# Patient Record
Sex: MALE | Race: WHITE | NOT HISPANIC OR LATINO | URBAN - METROPOLITAN AREA
[De-identification: names, ages, dates, MRNs, and addresses within clinical notes are randomized per-mention and may not be internally consistent; named-entity substitution may affect disease eponyms.]

---

## 2019-02-27 ENCOUNTER — APPOINTMENT (RX ONLY)
Dept: URBAN - METROPOLITAN AREA OTHER 12 | Facility: OTHER | Age: 64
Setting detail: DERMATOLOGY
End: 2019-02-27

## 2019-02-27 DIAGNOSIS — S04.3 INJURY OF TRIGEMINAL NERVE: ICD-10-CM

## 2019-02-27 PROBLEM — L30.9 DERMATITIS, UNSPECIFIED: Status: ACTIVE | Noted: 2019-02-27

## 2019-02-27 PROBLEM — Z92.3 PERSONAL HISTORY OF IRRADIATION: Status: ACTIVE | Noted: 2019-02-27

## 2019-02-27 PROCEDURE — ? TREATMENT REGIMEN

## 2019-02-27 PROCEDURE — ? PRESCRIPTION

## 2019-02-27 PROCEDURE — 99213 OFFICE O/P EST LOW 20 MIN: CPT

## 2019-02-27 PROCEDURE — ? ORDER TESTS

## 2019-02-27 RX ORDER — VALACYCLOVIR 1 G/1
- TABLET ORAL TID
Qty: 30 | Refills: 0 | Status: ERX | COMMUNITY
Start: 2019-02-27

## 2019-02-27 RX ADMIN — VALACYCLOVIR -: 1 TABLET ORAL at 20:04

## 2019-02-27 NOTE — PROCEDURE: TREATMENT REGIMEN
Detail Level: Simple
Otc Regimen: Vaseline multiple times daily
Plan: Vaseline was applied to the nose and forehead\\nReferral faxed to Opthamalogist

## 2019-03-06 ENCOUNTER — APPOINTMENT (RX ONLY)
Dept: URBAN - METROPOLITAN AREA OTHER 12 | Facility: OTHER | Age: 64
Setting detail: DERMATOLOGY
End: 2019-03-06

## 2019-03-06 DIAGNOSIS — B02.9 ZOSTER WITHOUT COMPLICATIONS: ICD-10-CM | Status: IMPROVED

## 2019-03-06 DIAGNOSIS — B02.29 OTHER POSTHERPETIC NERVOUS SYSTEM INVOLVEMENT: ICD-10-CM

## 2019-03-06 PROCEDURE — ? TREATMENT REGIMEN

## 2019-03-06 PROCEDURE — ? COUNSELING

## 2019-03-06 PROCEDURE — 99213 OFFICE O/P EST LOW 20 MIN: CPT

## 2019-03-06 ASSESSMENT — LOCATION ZONE DERM: LOCATION ZONE: FACE

## 2019-03-06 ASSESSMENT — LOCATION DETAILED DESCRIPTION DERM: LOCATION DETAILED: RIGHT SUPERIOR CENTRAL MALAR CHEEK

## 2019-03-06 ASSESSMENT — LOCATION SIMPLE DESCRIPTION DERM: LOCATION SIMPLE: RIGHT CHEEK

## 2019-03-06 NOTE — HPI: SHINGLES (HERPES ZOSTER)
Please Check The Phrase That Best Describes Your Shingles?: focal
Is This A New Presentation, Or A Follow-Up?: Follow Up Zoster
Additional History: Patient has continued with vaseline and has seen ophthalmologist, he will be having implants done.

## 2019-03-06 NOTE — PROCEDURE: COUNSELING
Detail Level: Simple
Patient Specific Counseling (Will Not Stick From Patient To Patient): could consider topical otc capzasin cream but recommend caution by the eye

## 2019-03-06 NOTE — PROCEDURE: TREATMENT REGIMEN
Plan: Complete valacyclovir prescription \\nShingrix vaccine discussed/encouraged
Continue Regimen: Vaseline
Detail Level: Simple
Plan: May increase neurontin as discussed (slowly) max dose 3600mg daily. Recommend adding 1/2 tab at bedtime to start. He currently takes 600 mg tid.

## 2019-04-10 ENCOUNTER — APPOINTMENT (RX ONLY)
Dept: URBAN - METROPOLITAN AREA OTHER 12 | Facility: OTHER | Age: 64
Setting detail: DERMATOLOGY
End: 2019-04-10

## 2019-04-10 DIAGNOSIS — B02.9 ZOSTER WITHOUT COMPLICATIONS: ICD-10-CM | Status: RESOLVING

## 2019-04-10 DIAGNOSIS — B02.29 OTHER POSTHERPETIC NERVOUS SYSTEM INVOLVEMENT: ICD-10-CM

## 2019-04-10 PROCEDURE — 99213 OFFICE O/P EST LOW 20 MIN: CPT

## 2019-04-10 PROCEDURE — ? PRESCRIPTION MEDICATION MANAGEMENT

## 2019-04-10 PROCEDURE — ? COUNSELING

## 2019-04-10 ASSESSMENT — LOCATION DETAILED DESCRIPTION DERM
LOCATION DETAILED: RIGHT SUPERIOR LATERAL MALAR CHEEK
LOCATION DETAILED: RIGHT LATERAL TEMPLE

## 2019-04-10 ASSESSMENT — LOCATION ZONE DERM: LOCATION ZONE: FACE

## 2019-04-10 ASSESSMENT — LOCATION SIMPLE DESCRIPTION DERM
LOCATION SIMPLE: RIGHT CHEEK
LOCATION SIMPLE: RIGHT TEMPLE

## 2019-04-10 NOTE — PROCEDURE: PRESCRIPTION MEDICATION MANAGEMENT
Continue Regimen: Gabapentin 2700mg daily. Pt is taking 1 1/2 tab TID, Vaseline as needed to affected areas.
Render In Strict Bullet Format?: No
Detail Level: Zone
Plan: he has upcoming appt with pcp to discuss Shingrix
Plan: defer capsaicin cream
Continue Regimen: Gabapentin 1 1/2 tablets TID. Total of 2700mg daily. try to taper as pain improves

## 2019-06-12 ENCOUNTER — APPOINTMENT (RX ONLY)
Dept: URBAN - METROPOLITAN AREA OTHER 12 | Facility: OTHER | Age: 64
Setting detail: DERMATOLOGY
End: 2019-06-12

## 2019-06-12 DIAGNOSIS — Z86.19 PERSONAL HISTORY OF OTHER INFECTIOUS AND PARASITIC DISEASES: ICD-10-CM

## 2019-06-12 DIAGNOSIS — L91.0 HYPERTROPHIC SCAR: ICD-10-CM

## 2019-06-12 DIAGNOSIS — B02.29 OTHER POSTHERPETIC NERVOUS SYSTEM INVOLVEMENT: ICD-10-CM | Status: WELL CONTROLLED

## 2019-06-12 PROCEDURE — ? COUNSELING

## 2019-06-12 PROCEDURE — ? PRESCRIPTION MEDICATION MANAGEMENT

## 2019-06-12 PROCEDURE — ? DEFER

## 2019-06-12 PROCEDURE — 99213 OFFICE O/P EST LOW 20 MIN: CPT

## 2019-06-12 ASSESSMENT — LOCATION ZONE DERM: LOCATION ZONE: NOSE

## 2019-06-12 ASSESSMENT — LOCATION DETAILED DESCRIPTION DERM
LOCATION DETAILED: RIGHT NASAL ALA
LOCATION DETAILED: RIGHT NASAL SIDEWALL

## 2019-06-12 ASSESSMENT — LOCATION SIMPLE DESCRIPTION DERM: LOCATION SIMPLE: RIGHT NOSE

## 2019-06-12 NOTE — PROCEDURE: DEFER
Other Procedure: ILK
Reason To Defer Override: due to risk of atrophy on nose
Detail Level: Zone
Introduction Text (Please End With A Colon): The following procedure was deferred:

## 2019-06-12 NOTE — PROCEDURE: COUNSELING
Detail Level: Detailed
Detail Level: Zone
Patient Specific Counseling (Will Not Stick From Patient To Patient): recommedn daily massage with vasline

## 2019-06-12 NOTE — PROCEDURE: PRESCRIPTION MEDICATION MANAGEMENT
Detail Level: Zone
Render In Strict Bullet Format?: No
Continue Regimen: Gabapentin 300mg taking two capsules tid as managed by PCP
Plan: Recommended shingrix vaccine